# Patient Record
Sex: MALE | Race: WHITE
[De-identification: names, ages, dates, MRNs, and addresses within clinical notes are randomized per-mention and may not be internally consistent; named-entity substitution may affect disease eponyms.]

---

## 2018-06-13 ENCOUNTER — HOSPITAL ENCOUNTER (OUTPATIENT)
Dept: HOSPITAL 59 - SUR | Age: 48
Discharge: HOME | End: 2018-06-13
Attending: ORTHOPAEDIC SURGERY
Payer: MEDICARE

## 2018-06-13 DIAGNOSIS — M24.10: ICD-10-CM

## 2018-06-13 DIAGNOSIS — M65.9: ICD-10-CM

## 2018-06-13 DIAGNOSIS — M22.41: ICD-10-CM

## 2018-06-13 DIAGNOSIS — E11.9: ICD-10-CM

## 2018-06-13 DIAGNOSIS — Z79.4: ICD-10-CM

## 2018-06-13 DIAGNOSIS — S83.281A: Primary | ICD-10-CM

## 2018-06-15 NOTE — OPERATIVE NOTE
DATE OF SURGERY:  06/13/18



PREOPERATIVE DIAGNOSIS:  INTERNAL DERANGEMENT OF THE RIGHT KNEE. 



POSTOPERATIVE DIAGNOSES:  

1.  DIFFUSE SYNOVITIS.

2.  GRADE 3 CHONDROMALACIA PATELLA.

3.  LARGE CHONDRAL ULCER OF THE MEDIAL FEMORAL CONDYLE WITH GRADE 3 TO 4 CHANGE.

4.  SMALL FLAP TEAR INVOLVING THE ANTERIOR MEDIAL HORN OF THE LATERAL MENISCUS. 

5.  SMALL GRADE 3 CHANGE OF THE LATERAL FEMORAL CONDYLE.



PROCEDURE:  

1.  RIGHT KNEE ARTHROSCOPY WITH PARTIAL LATERAL MENISCECTOMY.

2.  RIGHT KNEE ARTHROSCOPY WITH COMPLETE SYNOVECTOMY.

3.  RIGHT KNEE ARTHROSCOPY WITH CHONDROPLASTY OF THE MEDIAL FEMORAL CONDYLE AND 
PATELLA. 



STAFF SURGEON:  VIVIAN CUNHA M.D.



ANESTHESIA:  GENERAL.



PREPARATION:  CHLORAPREP.



INDIVIDUAL CONSIDERATIONS:  NONE.



PROCEDURE:  The patient was taken to the Operating Room and placed supine on 
the operating table. He had a successful induction of a general anesthetic. His 
right lower extremity was prepped and draped in the usual fashion. 



The patient had a superior lateral inflow cannula placed. The skin was 
infiltrated with 0.5% Marcaine with Epinephrine prior. A large blood-tinged 
effusion was drained and the knee was inflated with normal saline. An inferior 
medial and an inferior lateral portal were made in a similar fashion. The 
arthroscope was introduced through the inferior lateral portal up into the 
pouch. The patient had diffuse synovitis. No loose bodies were seen. A 
synovectomy was performed with a shaver in the pouch and both gutters. She had 
grade three changes on the patella. The notch had fibrocartilage. The grade 3 
changes on the patella were smoothed. Medially, he had a large ulcer involving 
pretty much all of the medial femoral condyle with cratering and crevicing and 
loose cartilage. This was smoothed off with a shaver. Some areas were down to 
bone. The meniscus was otherwise intact. The tibial plateau had a little soft 
change but otherwise intact. In the notch, the cruciates were normal. Laterally
, he had a small flap tear involving the anterior horn of the lateral meniscus 
and this was debrided with a shaver. The remainder of the meniscus was intact. 
Small grade 3 centrally on lateral femoral condyle, which was smoothed and an 
intact tibial plateau. After irrigation, the portals were closed with staples 
and 15 mL of  0.5% Marcaine with Epinephrine along with 4 mg of Morphine and 80 
mg of DepoMedrol were injected into the knee and a sterile Bulkee compressive 
dressing was applied. The patient tolerated the procedure well. Needle and 
sponge counts were correct. Estimated blood loss was minimal and he was taken 
back to Recovery in good condition. There were no complications.  



JOB NUMBER:  [id number]
MTDD